# Patient Record
Sex: FEMALE | Race: WHITE | NOT HISPANIC OR LATINO | Employment: OTHER | URBAN - METROPOLITAN AREA
[De-identification: names, ages, dates, MRNs, and addresses within clinical notes are randomized per-mention and may not be internally consistent; named-entity substitution may affect disease eponyms.]

---

## 2020-11-20 ENCOUNTER — NEW PATIENT COMPREHENSIVE (OUTPATIENT)
Dept: URBAN - METROPOLITAN AREA CLINIC 32 | Facility: CLINIC | Age: 70
End: 2020-11-20

## 2020-11-20 DIAGNOSIS — H52.4: ICD-10-CM

## 2020-11-20 DIAGNOSIS — H52.203: ICD-10-CM

## 2020-11-20 DIAGNOSIS — H52.13: ICD-10-CM

## 2020-11-20 DIAGNOSIS — E11.9: ICD-10-CM

## 2020-11-20 DIAGNOSIS — H04.123: ICD-10-CM

## 2020-11-20 DIAGNOSIS — H43.813: ICD-10-CM

## 2020-11-20 PROCEDURE — 92004 COMPRE OPH EXAM NEW PT 1/>: CPT

## 2020-11-20 PROCEDURE — 92015 DETERMINE REFRACTIVE STATE: CPT

## 2020-11-20 ASSESSMENT — KERATOMETRY
OD_K1POWER_DIOPTERS: 46.75
OD_AXISANGLE_DEGREES: 179
OS_K1POWER_DIOPTERS: 47
OD_K2POWER_DIOPTERS: 44.75
OD_AXISANGLE2_DEGREES: 89
OS_K2POWER_DIOPTERS: 45
OS_AXISANGLE_DEGREES: 1
OS_AXISANGLE2_DEGREES: 91

## 2020-11-20 ASSESSMENT — VISUAL ACUITY
OD_CC: J1
OD_CC: 20/25
OS_CC: 20/20
OS_CC: J1+

## 2020-11-20 ASSESSMENT — TONOMETRY
OD_IOP_MMHG: 16
OS_IOP_MMHG: 15